# Patient Record
Sex: MALE | Race: WHITE | NOT HISPANIC OR LATINO | URBAN - METROPOLITAN AREA
[De-identification: names, ages, dates, MRNs, and addresses within clinical notes are randomized per-mention and may not be internally consistent; named-entity substitution may affect disease eponyms.]

---

## 2024-05-07 ENCOUNTER — TELEPHONE ENCOUNTER (OUTPATIENT)
Dept: URBAN - METROPOLITAN AREA CLINIC 7 | Facility: CLINIC | Age: 34
End: 2024-05-07

## 2024-05-11 PROBLEM — 62315008: Status: ACTIVE | Noted: 2024-05-11

## 2024-05-11 PROBLEM — 301716002: Status: ACTIVE | Noted: 2024-05-11

## 2024-05-13 ENCOUNTER — OFFICE VISIT (OUTPATIENT)
Dept: URBAN - METROPOLITAN AREA CLINIC 7 | Facility: CLINIC | Age: 34
End: 2024-05-13
Payer: COMMERCIAL

## 2024-05-13 ENCOUNTER — DASHBOARD ENCOUNTERS (OUTPATIENT)
Age: 34
End: 2024-05-13

## 2024-05-13 VITALS
SYSTOLIC BLOOD PRESSURE: 120 MMHG | RESPIRATION RATE: 16 BRPM | DIASTOLIC BLOOD PRESSURE: 80 MMHG | HEIGHT: 73 IN | BODY MASS INDEX: 27.7 KG/M2 | TEMPERATURE: 97.8 F | WEIGHT: 209 LBS

## 2024-05-13 DIAGNOSIS — R19.7 DIARRHEA, UNSPECIFIED TYPE: ICD-10-CM

## 2024-05-13 DIAGNOSIS — R10.32 LLQ PAIN: ICD-10-CM

## 2024-05-13 PROCEDURE — 99204 OFFICE O/P NEW MOD 45 MIN: CPT | Performed by: INTERNAL MEDICINE

## 2024-05-13 RX ORDER — METRONIDAZOLE 500 MG/1
TABLET, FILM COATED ORAL
Qty: 14 TABLET | Status: DISCONTINUED | COMMUNITY

## 2024-05-13 RX ORDER — CIPROFLOXACIN 500 MG/1
TAKE ONE TABLET BY MOUTH TWICE A DAY FOR 7 DAYS TABLET ORAL
Qty: 14 UNSPECIFIED | Refills: 0 | Status: DISCONTINUED | COMMUNITY

## 2024-05-13 RX ORDER — DICYCLOMINE HYDROCHLORIDE 10 MG/1
AS DIRECTED CAPSULE ORAL
Qty: 60 | Refills: 3 | OUTPATIENT
Start: 2024-05-13 | End: 2024-09-10

## 2024-07-10 PROBLEM — 365584005: Status: ACTIVE | Noted: 2024-07-10

## 2024-07-11 ENCOUNTER — OFFICE VISIT (OUTPATIENT)
Dept: URBAN - METROPOLITAN AREA CLINIC 7 | Facility: CLINIC | Age: 34
End: 2024-07-11
Payer: COMMERCIAL

## 2024-07-11 ENCOUNTER — LAB OUTSIDE AN ENCOUNTER (OUTPATIENT)
Dept: URBAN - METROPOLITAN AREA CLINIC 7 | Facility: CLINIC | Age: 34
End: 2024-07-11

## 2024-07-11 VITALS
BODY MASS INDEX: 27.96 KG/M2 | WEIGHT: 211 LBS | RESPIRATION RATE: 16 BRPM | HEIGHT: 73 IN | TEMPERATURE: 97.9 F | SYSTOLIC BLOOD PRESSURE: 120 MMHG | DIASTOLIC BLOOD PRESSURE: 80 MMHG

## 2024-07-11 DIAGNOSIS — R10.32 LLQ PAIN: ICD-10-CM

## 2024-07-11 DIAGNOSIS — R19.7 DIARRHEA, UNSPECIFIED TYPE: ICD-10-CM

## 2024-07-11 DIAGNOSIS — R76.8 ELEVATED ANTI-TISSUE TRANSGLUTAMINASE (TTG) IGA LEVEL: ICD-10-CM

## 2024-07-11 PROCEDURE — 99214 OFFICE O/P EST MOD 30 MIN: CPT | Performed by: INTERNAL MEDICINE

## 2024-07-11 RX ORDER — DICYCLOMINE HYDROCHLORIDE 10 MG/1
AS DIRECTED CAPSULE ORAL
Qty: 60 | Refills: 3 | Status: DISCONTINUED | COMMUNITY
Start: 2024-05-13 | End: 2024-09-10

## 2024-07-11 NOTE — HPI-TODAY'S VISIT:
LV 5/2024. Eval was post-ER follow up. Presented for LLQ pain, diarrhea. CT with PO contrast was negative for bowel inflammation. Was given cipro/flagyl. No prior GI history. He had gone out to a family cookout, had nausea/vomiting syndrome, LLQ pain. Pain was better at time of eval, but still had discomfort. Diarrhea on and off all week. No more vomiting. Discomfort persists. Ice pick sensation. No GI bleeding. Plan was stool testing (culture, O&P, EPI, calprotectin), although favor an infectious enteritis and some post-infectious IBS. Will use IBgard, dicyclomine, will also get celiac Ab, given fam hx of celiac. TTG IgA elevated to 19, CRP normal, stool testing negative for infxn, calpro, elastase. I advised gluten free diet. FU now. He still has some soreness on the left, but its rare, not severe, nothing crippling. His bowel habits are better, formed, no bleeding.He is largely feeling well.

## 2024-07-31 LAB
GLIADIN (DEAMIDATED): 5.4
GLIADIN (DEAMIDATED): 9.5
IMMUNOGLOBULIN A, QN, SERUM: 270
T-TRANSGLUTAMINASE (TTG) IGA: 25.7
T-TRANSGLUTAMINASE (TTG) IGG: <1

## 2024-08-02 ENCOUNTER — CLAIMS CREATED FROM THE CLAIM WINDOW (OUTPATIENT)
Dept: URBAN - METROPOLITAN AREA SURGERY CENTER 5 | Facility: SURGERY CENTER | Age: 34
End: 2024-08-02
Payer: COMMERCIAL

## 2024-08-02 ENCOUNTER — CLAIMS CREATED FROM THE CLAIM WINDOW (OUTPATIENT)
Dept: URBAN - METROPOLITAN AREA CLINIC 4 | Facility: CLINIC | Age: 34
End: 2024-08-02
Payer: COMMERCIAL

## 2024-08-02 DIAGNOSIS — K31.89 OTHER DISEASES OF STOMACH AND DUODENUM: ICD-10-CM

## 2024-08-02 DIAGNOSIS — K63.89 OTHER SPECIFIED DISEASES OF INTESTINE: ICD-10-CM

## 2024-08-02 PROCEDURE — 88342 IMHCHEM/IMCYTCHM 1ST ANTB: CPT | Performed by: PATHOLOGY

## 2024-08-02 PROCEDURE — 00731 ANES UPR GI NDSC PX NOS: CPT | Performed by: NURSE ANESTHETIST, CERTIFIED REGISTERED

## 2024-08-02 PROCEDURE — 43239 EGD BIOPSY SINGLE/MULTIPLE: CPT | Performed by: INTERNAL MEDICINE

## 2024-08-02 PROCEDURE — 88305 TISSUE EXAM BY PATHOLOGIST: CPT | Performed by: PATHOLOGY
